# Patient Record
Sex: MALE | Race: WHITE | NOT HISPANIC OR LATINO
[De-identification: names, ages, dates, MRNs, and addresses within clinical notes are randomized per-mention and may not be internally consistent; named-entity substitution may affect disease eponyms.]

---

## 2022-05-23 ENCOUNTER — NON-APPOINTMENT (OUTPATIENT)
Age: 87
End: 2022-05-23

## 2022-05-23 ENCOUNTER — APPOINTMENT (OUTPATIENT)
Dept: CARDIOLOGY | Facility: CLINIC | Age: 87
End: 2022-05-23
Payer: MEDICARE

## 2022-05-23 ENCOUNTER — APPOINTMENT (OUTPATIENT)
Dept: UROLOGY | Facility: CLINIC | Age: 87
End: 2022-05-23
Payer: MEDICARE

## 2022-05-23 VITALS
SYSTOLIC BLOOD PRESSURE: 130 MMHG | TEMPERATURE: 97.8 F | BODY MASS INDEX: 18.26 KG/M2 | WEIGHT: 93 LBS | OXYGEN SATURATION: 99 % | HEIGHT: 60 IN | HEART RATE: 76 BPM | DIASTOLIC BLOOD PRESSURE: 70 MMHG

## 2022-05-23 VITALS
OXYGEN SATURATION: 98 % | HEIGHT: 60 IN | WEIGHT: 93 LBS | TEMPERATURE: 94.3 F | BODY MASS INDEX: 18.26 KG/M2 | HEART RATE: 59 BPM | DIASTOLIC BLOOD PRESSURE: 74 MMHG | SYSTOLIC BLOOD PRESSURE: 159 MMHG

## 2022-05-23 DIAGNOSIS — Z86.79 PERSONAL HISTORY OF OTHER DISEASES OF THE CIRCULATORY SYSTEM: ICD-10-CM

## 2022-05-23 DIAGNOSIS — R42 DIZZINESS AND GIDDINESS: ICD-10-CM

## 2022-05-23 DIAGNOSIS — R06.02 SHORTNESS OF BREATH: ICD-10-CM

## 2022-05-23 DIAGNOSIS — Z87.438 PERSONAL HISTORY OF OTHER DISEASES OF MALE GENITAL ORGANS: ICD-10-CM

## 2022-05-23 DIAGNOSIS — R07.9 CHEST PAIN, UNSPECIFIED: ICD-10-CM

## 2022-05-23 DIAGNOSIS — Z87.19 PERSONAL HISTORY OF OTHER DISEASES OF THE DIGESTIVE SYSTEM: ICD-10-CM

## 2022-05-23 DIAGNOSIS — E78.5 HYPERLIPIDEMIA, UNSPECIFIED: ICD-10-CM

## 2022-05-23 DIAGNOSIS — Z87.440 PERSONAL HISTORY OF URINARY (TRACT) INFECTIONS: ICD-10-CM

## 2022-05-23 DIAGNOSIS — Z78.9 OTHER SPECIFIED HEALTH STATUS: ICD-10-CM

## 2022-05-23 DIAGNOSIS — Z63.4 DISAPPEARANCE AND DEATH OF FAMILY MEMBER: ICD-10-CM

## 2022-05-23 DIAGNOSIS — K59.09 OTHER CONSTIPATION: ICD-10-CM

## 2022-05-23 PROBLEM — Z00.00 ENCOUNTER FOR PREVENTIVE HEALTH EXAMINATION: Status: ACTIVE | Noted: 2022-05-23

## 2022-05-23 LAB
BILIRUB UR QL STRIP: NEGATIVE
CLARITY UR: CLEAR
COLLECTION METHOD: NORMAL
GLUCOSE UR-MCNC: NEGATIVE
HCG UR QL: 0.2 EU/DL
HGB UR QL STRIP.AUTO: NEGATIVE
KETONES UR-MCNC: NEGATIVE
LEUKOCYTE ESTERASE UR QL STRIP: NEGATIVE
NITRITE UR QL STRIP: NEGATIVE
PH UR STRIP: 6
PROT UR STRIP-MCNC: NEGATIVE
SP GR UR STRIP: 1.01

## 2022-05-23 PROCEDURE — 81002 URINALYSIS NONAUTO W/O SCOPE: CPT

## 2022-05-23 PROCEDURE — 99205 OFFICE O/P NEW HI 60 MIN: CPT

## 2022-05-23 PROCEDURE — 93000 ELECTROCARDIOGRAM COMPLETE: CPT | Mod: 59

## 2022-05-23 PROCEDURE — 93242 EXT ECG>48HR<7D RECORDING: CPT

## 2022-05-23 RX ORDER — DOCUSATE SODIUM 100 MG/1
100 CAPSULE ORAL
Qty: 60 | Refills: 0 | Status: ACTIVE | COMMUNITY
Start: 2022-05-23 | End: 1900-01-01

## 2022-05-23 RX ORDER — ATORVASTATIN CALCIUM 10 MG/1
10 TABLET, FILM COATED ORAL DAILY
Qty: 90 | Refills: 3 | Status: ACTIVE | COMMUNITY
Start: 1900-01-01 | End: 1900-01-01

## 2022-05-23 RX ORDER — TAMSULOSIN HYDROCHLORIDE 0.4 MG/1
0.4 CAPSULE ORAL
Refills: 0 | Status: ACTIVE | COMMUNITY

## 2022-05-23 RX ORDER — TAMSULOSIN HYDROCHLORIDE 0.4 MG/1
0.4 CAPSULE ORAL TWICE DAILY
Qty: 40 | Refills: 5 | Status: ACTIVE | COMMUNITY
Start: 2022-05-23 | End: 1900-01-01

## 2022-05-23 SDOH — SOCIAL STABILITY - SOCIAL INSECURITY: DISSAPEARANCE AND DEATH OF FAMILY MEMBER: Z63.4

## 2022-05-23 NOTE — ASSESSMENT
[FreeTextEntry1] : 86 male s/p TURP in [place], due to enlarged median lobe - now with recurrent retention and severe LUTS, weak stream:\par \par Plan:\par Make tamsulosin 0.4 mg BID\par treat hard stool/constipation: Miralax/colace\par US KB for PVR and PS\par FU cystoscopy to check for bladder neck contracture/urethral stricture\par RTC 3 weeks

## 2022-05-23 NOTE — PHYSICAL EXAM
[General Appearance - Well Developed] : well developed [General Appearance - Well Nourished] : well nourished [Heart Rate And Rhythm] : Heart rate and rhythm were normal [] : no respiratory distress [Bowel Sounds] : normal bowel sounds [Abdomen Soft] : soft [Urinary Bladder Findings] : the bladder was normal on palpation [Normal Station and Gait] : the gait and station were normal for the patient's age [Skin Color & Pigmentation] : normal skin color and pigmentation [No Focal Deficits] : no focal deficits [Oriented To Time, Place, And Person] : oriented to person, place, and time

## 2022-05-23 NOTE — HISTORY OF PRESENT ILLNESS
[FreeTextEntry1] : 86 male patient with a pertinent past medical history of BPH GERD UTI HTN who presents to clinic today with complaints of LUTS: \par he started on tamsulosin once daily. he has undergone surgery in [place] in october 2021 - TURP\par he had urinary incontinence postop - they went back in again to OR and ? injection of agent- as per son, urinary incontinence resolved.\par however now patient has same issue, back to severe LUTS.\par incomplete emptying of urine and frequency is the most bothersome symptom.  \par Wakes up 2-3 x /night to use bathroom. \par Quality of his stream is  poor. \par  \par he does have constipation and always has hard stool.\par IPSS:29 currently post TURP\par  KB: 11/2021: PS=50 g / no PVR/ large median lobe\par \par \par

## 2022-05-23 NOTE — ASSESSMENT
[FreeTextEntry1] : History of bradycardia with dizziness -I recommended Zio patch for 3 days, carotid Doppler to see any severe stenosis.\par \par History of CAD and dizziness -I recommend echocardiogram for LV size, wall motion, LVEF and also recommend Lexiscan marker perfusion can.\par \par Symptoms of benign pressure hypertrophy -I recommend him to urologist, continue medications.\par \par Deafness -I have commended him to neurologist\par \par Risk factor monitoring has been discussed with him in great length; he will be reeval by me after cardiac testing.

## 2022-05-23 NOTE — HISTORY OF PRESENT ILLNESS
[FreeTextEntry1] : 86 years old  gentleman with history of CAD, dyslipidemia came for cardiac evaluation.\par \par Is remote history of for MI, lately his heart rate drops below 60 and he is very much concerned.  Intermittently he has feelings where he feels very weak nauseous diaphoretic and dizzy without any vertigo, episodes last few seconds to few minutes and dissipates by themselves, episodes are very intermittent, sometimes in resting condition sometimes when walking.\par \par No prior history of diabetes, hypertension, CHF.\par \par He has history of BPH, had a green light laser anemia few months ago followed by some other cardiac procedure, still having significant symptoms of bladder neck obstruction.  His stream is very weak with hesitation and frequency of urination especially at nighttime.

## 2022-05-24 LAB
APPEARANCE: CLEAR
BILIRUBIN URINE: NEGATIVE
BLOOD URINE: NEGATIVE
COLOR: YELLOW
GLUCOSE QUALITATIVE U: NEGATIVE
KETONES URINE: NEGATIVE
LEUKOCYTE ESTERASE URINE: NEGATIVE
NITRITE URINE: NEGATIVE
PH URINE: 6.5
PROTEIN URINE: NEGATIVE
SPECIFIC GRAVITY URINE: 1.01
UROBILINOGEN URINE: NORMAL

## 2022-06-08 ENCOUNTER — APPOINTMENT (OUTPATIENT)
Dept: CARDIOLOGY | Facility: CLINIC | Age: 87
End: 2022-06-08

## 2022-06-08 PROCEDURE — 93244 EXT ECG>48HR<7D REV&INTERPJ: CPT

## 2022-06-20 ENCOUNTER — OUTPATIENT (OUTPATIENT)
Dept: OUTPATIENT SERVICES | Facility: HOSPITAL | Age: 87
LOS: 1 days | End: 2022-06-20

## 2022-06-20 ENCOUNTER — APPOINTMENT (OUTPATIENT)
Dept: ULTRASOUND IMAGING | Facility: CLINIC | Age: 87
End: 2022-06-20
Payer: MEDICAID

## 2022-06-20 DIAGNOSIS — R39.12 POOR URINARY STREAM: ICD-10-CM

## 2022-06-20 PROCEDURE — 76770 US EXAM ABDO BACK WALL COMP: CPT | Mod: 26

## 2022-06-22 ENCOUNTER — APPOINTMENT (OUTPATIENT)
Dept: UROLOGY | Facility: CLINIC | Age: 87
End: 2022-06-22
Payer: MEDICAID

## 2022-06-22 VITALS
SYSTOLIC BLOOD PRESSURE: 164 MMHG | HEART RATE: 56 BPM | TEMPERATURE: 95 F | HEIGHT: 60 IN | WEIGHT: 93 LBS | OXYGEN SATURATION: 99 % | DIASTOLIC BLOOD PRESSURE: 70 MMHG | BODY MASS INDEX: 18.26 KG/M2

## 2022-06-22 LAB
BILIRUB UR QL STRIP: NEGATIVE
CLARITY UR: CLEAR
COLLECTION METHOD: NORMAL
GLUCOSE UR-MCNC: NEGATIVE
HCG UR QL: 0.2 EU/DL
HGB UR QL STRIP.AUTO: NEGATIVE
KETONES UR-MCNC: NEGATIVE
LEUKOCYTE ESTERASE UR QL STRIP: NEGATIVE
NITRITE UR QL STRIP: NEGATIVE
PH UR STRIP: 6
PROT UR STRIP-MCNC: NEGATIVE
SP GR UR STRIP: 1

## 2022-06-22 PROCEDURE — 81002 URINALYSIS NONAUTO W/O SCOPE: CPT

## 2022-06-22 PROCEDURE — 52000 CYSTOURETHROSCOPY: CPT

## 2022-06-23 ENCOUNTER — OUTPATIENT (OUTPATIENT)
Dept: OUTPATIENT SERVICES | Facility: HOSPITAL | Age: 87
LOS: 1 days | End: 2022-06-23
Payer: MEDICARE

## 2022-06-23 ENCOUNTER — APPOINTMENT (OUTPATIENT)
Dept: ULTRASOUND IMAGING | Facility: CLINIC | Age: 87
End: 2022-06-23

## 2022-06-23 DIAGNOSIS — R39.12 POOR URINARY STREAM: ICD-10-CM

## 2022-06-23 PROCEDURE — 93975 VASCULAR STUDY: CPT

## 2022-06-23 PROCEDURE — 93975 VASCULAR STUDY: CPT | Mod: 26

## 2022-06-27 ENCOUNTER — APPOINTMENT (OUTPATIENT)
Dept: CARDIOLOGY | Facility: CLINIC | Age: 87
End: 2022-06-27

## 2022-06-27 ENCOUNTER — APPOINTMENT (OUTPATIENT)
Dept: CARDIOLOGY | Facility: CLINIC | Age: 87
End: 2022-06-27
Payer: MEDICAID

## 2022-06-27 PROCEDURE — 93306 TTE W/DOPPLER COMPLETE: CPT

## 2022-06-27 PROCEDURE — 93880 EXTRACRANIAL BILAT STUDY: CPT

## 2022-06-29 ENCOUNTER — NON-APPOINTMENT (OUTPATIENT)
Age: 87
End: 2022-06-29

## 2022-07-08 ENCOUNTER — OUTPATIENT (OUTPATIENT)
Dept: OUTPATIENT SERVICES | Facility: HOSPITAL | Age: 87
LOS: 1 days | End: 2022-07-08

## 2022-07-08 ENCOUNTER — TRANSCRIPTION ENCOUNTER (OUTPATIENT)
Age: 87
End: 2022-07-08

## 2022-07-08 PROCEDURE — 93010 ELECTROCARDIOGRAM REPORT: CPT

## 2022-07-12 ENCOUNTER — OUTPATIENT (OUTPATIENT)
Dept: OUTPATIENT SERVICES | Facility: HOSPITAL | Age: 87
LOS: 1 days | End: 2022-07-12
Payer: MEDICAID

## 2022-07-12 ENCOUNTER — APPOINTMENT (OUTPATIENT)
Dept: UROLOGY | Facility: HOSPITAL | Age: 87
End: 2022-07-12

## 2022-07-12 PROCEDURE — 52276 CYSTOSCOPY AND TREATMENT: CPT

## 2022-07-18 ENCOUNTER — APPOINTMENT (OUTPATIENT)
Dept: UROLOGY | Facility: CLINIC | Age: 87
End: 2022-07-18

## 2022-07-18 VITALS
BODY MASS INDEX: 18.46 KG/M2 | HEIGHT: 60 IN | HEART RATE: 65 BPM | OXYGEN SATURATION: 98 % | TEMPERATURE: 96.9 F | WEIGHT: 94 LBS | DIASTOLIC BLOOD PRESSURE: 69 MMHG | SYSTOLIC BLOOD PRESSURE: 175 MMHG

## 2022-07-18 DIAGNOSIS — N40.0 BENIGN PROSTATIC HYPERPLASIA WITHOUT LOWER URINARY TRACT SYMPMS: ICD-10-CM

## 2022-07-18 DIAGNOSIS — Z01.810 ENCOUNTER FOR PREPROCEDURAL CARDIOVASCULAR EXAMINATION: ICD-10-CM

## 2022-07-18 DIAGNOSIS — N35.919 UNSPECIFIED URETHRAL STRICTURE, MALE, UNSPECIFIED SITE: ICD-10-CM

## 2022-07-18 DIAGNOSIS — Z01.812 ENCOUNTER FOR PREPROCEDURAL LABORATORY EXAMINATION: ICD-10-CM

## 2022-07-18 DIAGNOSIS — R39.9 UNSPECIFIED SYMPTOMS AND SIGNS INVOLVING THE GENITOURINARY SYSTEM: ICD-10-CM

## 2022-07-18 PROCEDURE — 99213 OFFICE O/P EST LOW 20 MIN: CPT

## 2022-07-18 RX ORDER — PHENAZOPYRIDINE HYDROCHLORIDE 200 MG/1
200 TABLET ORAL
Qty: 60 | Refills: 0 | Status: ACTIVE | COMMUNITY
Start: 2022-07-18 | End: 1900-01-01

## 2022-07-18 NOTE — HISTORY OF PRESENT ILLNESS
[FreeTextEntry1] : PAST UROLOGIC HX:\par 86 male patient with a pertinent past medical history of BPH GERD UTI HTN who presents to clinic today with complaints of LUTS: \par he started on tamsulosin once daily. he has undergone surgery in [place] in october 2021 - TURP\par he had urinary incontinence postop - they went back in again to OR and ? injection of agent- as per son, urinary incontinence resolved.\par however now patient has same issue, back to severe LUTS.\par incomplete emptying of urine and frequency is the most bothersome symptom.  \par Wakes up 2-3 x /night to use bathroom. \par Quality of his stream is  poor. \par  \par he does have constipation and always has hard stool.\par IPSS:29 currently post TURP\par  KB: 11/2021: PS=50 g / no PVR/ large median lobe\par \par FU July 2022:  s/p laser treatment of urethral stricture with dr ramsey. - elam cath removed today, uncomplicated.

## 2022-07-18 NOTE — ASSESSMENT
[FreeTextEntry1] : 87 male s/p TURP in [place], due to enlarged median lobe - now with recurrent retention and severe LUTS, weak stream:\par FU July 2022:  s/p laser treatment of urethral stricture with dr ramsey. - elam cath removed today, uncomplicated.\par \par Plan:\par uroflowmetry + In office PVR in 2 weeks\par Pyridium script sent to pharmacy for dysuria

## 2022-07-20 DIAGNOSIS — K21.9 GASTRO-ESOPHAGEAL REFLUX DISEASE WITHOUT ESOPHAGITIS: ICD-10-CM

## 2022-07-20 DIAGNOSIS — N99.114 POSTPROCEDURAL URETHRAL STRICTURE, MALE, UNSPECIFIED: ICD-10-CM

## 2022-07-20 DIAGNOSIS — E78.5 HYPERLIPIDEMIA, UNSPECIFIED: ICD-10-CM

## 2022-07-21 ENCOUNTER — APPOINTMENT (OUTPATIENT)
Dept: CARDIOLOGY | Facility: CLINIC | Age: 87
End: 2022-07-21

## 2022-07-21 PROCEDURE — A9502: CPT

## 2022-07-21 PROCEDURE — 78452 HT MUSCLE IMAGE SPECT MULT: CPT

## 2022-07-21 PROCEDURE — 93015 CV STRESS TEST SUPVJ I&R: CPT

## 2022-08-03 ENCOUNTER — APPOINTMENT (OUTPATIENT)
Dept: UROLOGY | Facility: CLINIC | Age: 87
End: 2022-08-03

## 2022-08-03 VITALS
WEIGHT: 94 LBS | BODY MASS INDEX: 18.46 KG/M2 | HEART RATE: 73 BPM | HEIGHT: 60 IN | DIASTOLIC BLOOD PRESSURE: 82 MMHG | SYSTOLIC BLOOD PRESSURE: 177 MMHG | TEMPERATURE: 97.9 F

## 2022-08-03 DIAGNOSIS — N35.919 UNSPECIFIED URETHRAL STRICTURE, MALE, UNSPECIFIED SITE: ICD-10-CM

## 2022-08-03 PROCEDURE — 99213 OFFICE O/P EST LOW 20 MIN: CPT

## 2022-08-03 NOTE — PHYSICAL EXAM
[General Appearance - Well Developed] : well developed [General Appearance - Well Nourished] : well nourished [Bowel Sounds] : normal bowel sounds [Abdomen Soft] : soft [Urinary Bladder Findings] : the bladder was normal on palpation [Skin Color & Pigmentation] : normal skin color and pigmentation [Heart Rate And Rhythm] : Heart rate and rhythm were normal [] : no respiratory distress [Oriented To Time, Place, And Person] : oriented to person, place, and time [Normal Station and Gait] : the gait and station were normal for the patient's age [No Focal Deficits] : no focal deficits

## 2022-08-03 NOTE — ASSESSMENT
[FreeTextEntry1] : 87 male s/p TURP in [place], due to enlarged median lobe - now with recurrent retention and severe LUTS, weak stream:\par FU July 2022:  s/p laser treatment of urethral stricture with dr ramsey. - elam cath removed today, uncomplicated.\par FU August 2022: pt reports urinary symptoms have greatly improved. \par \par Plan:\par - Followup surveillance cystoscopy, with urethral dilation \par - Pt leaving for [facility] in September, will plan for follow up in 3 weeks prior to trip. \par

## 2022-08-03 NOTE — HISTORY OF PRESENT ILLNESS
[FreeTextEntry1] : PAST UROLOGIC HX:\par 86 male patient with a pertinent past medical history of BPH GERD UTI HTN who presents to clinic today with complaints of LUTS: \par he started on tamsulosin once daily. he has undergone surgery in [place] in october 2021 - TURP\par he had urinary incontinence postop - they went back in again to OR and ? injection of agent- as per son, urinary incontinence resolved.\par however now patient has same issue, back to severe LUTS.\par incomplete emptying of urine and frequency is the most bothersome symptom.  \par Wakes up 2-3 x /night to use bathroom. \par Quality of his stream is  poor. \par  \par he does have constipation and always has hard stool.\par IPSS:29 currently post TURP\par  KB: 11/2021: PS=50 g / no PVR/ large median lobe\par \par FU July 2022:  s/p laser treatment of urethral stricture with dr ramsey. - elam cath removed today, uncomplicated.\par \par FU August 2022: pt doing well, urinary symptoms greatly improved post procedure. Reports occasional, infrequent urinary leakage.

## 2022-08-17 ENCOUNTER — APPOINTMENT (OUTPATIENT)
Dept: UROLOGY | Facility: CLINIC | Age: 87
End: 2022-08-17

## 2022-08-17 VITALS
HEART RATE: 59 BPM | SYSTOLIC BLOOD PRESSURE: 172 MMHG | WEIGHT: 94 LBS | TEMPERATURE: 98.1 F | HEIGHT: 60 IN | DIASTOLIC BLOOD PRESSURE: 73 MMHG | BODY MASS INDEX: 18.46 KG/M2

## 2022-08-18 ENCOUNTER — APPOINTMENT (OUTPATIENT)
Dept: UROLOGY | Facility: CLINIC | Age: 87
End: 2022-08-18

## 2022-08-18 VITALS
HEART RATE: 62 BPM | BODY MASS INDEX: 18.46 KG/M2 | DIASTOLIC BLOOD PRESSURE: 62 MMHG | WEIGHT: 94 LBS | OXYGEN SATURATION: 98 % | SYSTOLIC BLOOD PRESSURE: 140 MMHG | HEIGHT: 60 IN | TEMPERATURE: 96.6 F

## 2022-08-18 PROCEDURE — 52000 CYSTOURETHROSCOPY: CPT

## 2022-08-31 ENCOUNTER — APPOINTMENT (OUTPATIENT)
Dept: UROLOGY | Facility: CLINIC | Age: 87
End: 2022-08-31

## 2022-09-03 ENCOUNTER — EMERGENCY (EMERGENCY)
Facility: HOSPITAL | Age: 87
LOS: 1 days | Discharge: HOME | End: 2022-09-03
Admitting: EMERGENCY MEDICINE

## 2022-09-03 DIAGNOSIS — N39.0 URINARY TRACT INFECTION, SITE NOT SPECIFIED: ICD-10-CM
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
## 2022-09-03 DIAGNOSIS — Z46.6 ENCOUNTER FOR FITTING AND ADJUSTMENT OF URINARY DEVICE: ICD-10-CM

## 2022-09-03 DIAGNOSIS — T83.038A LEAKAGE OF OTHER URINARY CATHETER, INITIAL ENCOUNTER: ICD-10-CM
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
## 2022-09-03 PROCEDURE — 99284 EMERGENCY DEPT VISIT MOD MDM: CPT

## 2022-09-13 ENCOUNTER — APPOINTMENT (OUTPATIENT)
Dept: UROLOGY | Facility: CLINIC | Age: 87
End: 2022-09-13
Payer: MEDICARE

## 2022-09-13 VITALS
BODY MASS INDEX: 20.77 KG/M2 | DIASTOLIC BLOOD PRESSURE: 81 MMHG | WEIGHT: 110 LBS | HEIGHT: 61 IN | HEART RATE: 79 BPM | OXYGEN SATURATION: 98 % | SYSTOLIC BLOOD PRESSURE: 171 MMHG | RESPIRATION RATE: 17 BRPM

## 2022-09-13 DIAGNOSIS — N35.919 UNSPECIFIED URETHRAL STRICTURE, MALE, UNSPECIFIED SITE: ICD-10-CM

## 2022-09-13 DIAGNOSIS — R39.12 POOR URINARY STREAM: ICD-10-CM

## 2022-09-13 PROCEDURE — 99214 OFFICE O/P EST MOD 30 MIN: CPT

## 2022-09-13 NOTE — ASSESSMENT
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  We discussed repeat endoscopic management, chronic catheter, dilation with intermittent self dilation, and urethroplasty.  Discussed the typical reported success rates with each treatment option.  Discussed with pt/son given his age there is considerable morbidity to a prolonged anesthesia and surgical reconstruction.  However if he desires to undergo urethroplasty would need to provide a period of urethral rest and further evaluation w RUG.\par \par After discussion, pt wishes to try CIC.  He does not wish to undergo additional surgery at this time.\par \par RN perform CIC teaching\par Catheter removed - pt taught and performed CIC w 16Fr catheter without issue\par \par F/u prn, f/u with Dr Dasilva

## 2022-09-13 NOTE — PHYSICAL EXAM
[General Appearance - Well Developed] : well developed [General Appearance - Well Nourished] : well nourished [Normal Appearance] : normal appearance [Well Groomed] : well groomed [General Appearance - In No Acute Distress] : no acute distress [Edema] : no peripheral edema [] : no respiratory distress [Respiration, Rhythm And Depth] : normal respiratory rhythm and effort [Exaggerated Use Of Accessory Muscles For Inspiration] : no accessory muscle use [Urethral Meatus] : meatus normal [Urinary Bladder Findings] : the bladder was normal on palpation [FreeTextEntry1] : elam in place clear yellow urine

## 2022-09-13 NOTE — HISTORY OF PRESENT ILLNESS
[FreeTextEntry1] : Patient is a 88 yo M who presents for urethral stricture.\par He is referred by Dr Dasilva for consultation.\par He has h/o BPH s/p TURP in [place] in Oct 2021. He had urinary incontinence postop - they went back in again to OR and ? injection of agent- as per son, urinary incontinence resolved.  Records from [place] reviewed - monopolar TURP for 50 gm prostate.  Per OP note, normal urethra at that time.\par however now patient has same issue, back to severe LUTS.\par Baseline incomplete emptying of urine and frequency is the most bothersome symptom. \par Wakes up 2-3 x /night to use bathroom. \par Quality of his stream is poor. \par  \par he does have constipation and always has hard stool.\par IPSS:29 currently post TURP\par \par He had renal/bladder ULT in 6/2022 which showed no significant PVR and mildly enlarged prostate 33cc.  Scrotal ULT large L epididymal cyst.\par Cystoscopy by Dr Dasilva showed urethral strictures.\par He then underwent laser incision by Dr Mcconnell in 7/2022.\par Initially did well post incision.\par However shortly thereafter within a few wks his LUTS recurred.\par Last visit with Dr Dasilva he was dilated and had elam placed.\par Was upsized to 20Fr catheter. \par \par

## 2022-09-21 NOTE — PHYSICAL EXAM
[General Appearance - Well Developed] : well developed [General Appearance - Well Nourished] : well nourished [Bowel Sounds] : normal bowel sounds [Abdomen Soft] : soft [Urinary Bladder Findings] : the bladder was normal on palpation [Skin Color & Pigmentation] : normal skin color and pigmentation [Heart Rate And Rhythm] : Heart rate and rhythm were normal Right lower quadrant abdominal pain [] : no respiratory distress [Oriented To Time, Place, And Person] : oriented to person, place, and time [Normal Station and Gait] : the gait and station were normal for the patient's age [No Focal Deficits] : no focal deficits Gastritis

## 2022-09-22 ENCOUNTER — APPOINTMENT (OUTPATIENT)
Dept: UROLOGY | Facility: CLINIC | Age: 87
End: 2022-09-22

## 2022-09-22 PROCEDURE — 51702 INSERT TEMP BLADDER CATH: CPT

## 2022-09-22 NOTE — HISTORY OF PRESENT ILLNESS
[FreeTextEntry1] : PAST UROLOGIC HX:\par 86 male patient with a pertinent past medical history of BPH GERD UTI HTN who presents to clinic today with complaints of LUTS: \par he started on tamsulosin once daily. he has undergone surgery in [place] in october 2021 - TURP\par he had urinary incontinence postop - they went back in again to OR and ? injection of agent- as per son, urinary incontinence resolved.\par however now patient has same issue, back to severe LUTS.\par incomplete emptying of urine and frequency is the most bothersome symptom.  \par Wakes up 2-3 x /night to use bathroom. \par Quality of his stream is  poor. \par  \par he does have constipation and always has hard stool.\par IPSS:29 currently post TURP\par  KB: 11/2021: PS=50 g / no PVR/ large median lobe\par \par FU July 2022:  s/p laser treatment of urethral stricture with dr chauhan. - elam cath removed today, uncomplicated.\par \par FU August 2022: pt doing well, urinary symptoms greatly improved post procedure. Reports occasional, infrequent urinary leakage. \par \par FU 8/18/22- pt underwent cystoscopy, evidence of reformation/recurrent urethral stricture and narrowing urethra. Wire passed, urethra bluntly dilated with flexible cystoscope and 20f elam passed over guidewire. Referred to Dr. Escalona for opinion re: recurrent stricture.\par \par FU 9/22/22: Pt seen by Dr. Escalona, reviewed management options for recurrent stricture. Pt did not want to undergo additional surgeries, opted for CIC. Teaching was provided, papa was dc'd. Patient attempted CIC but is not comfortable with it - prefers indwelling elam catheter. recent UTI, seen in urgent care, started oral antibiotics.\par \par

## 2022-10-03 ENCOUNTER — LABORATORY RESULT (OUTPATIENT)
Age: 87
End: 2022-10-03

## 2022-10-03 ENCOUNTER — APPOINTMENT (OUTPATIENT)
Dept: UROLOGY | Facility: CLINIC | Age: 87
End: 2022-10-03

## 2022-10-03 VITALS
SYSTOLIC BLOOD PRESSURE: 187 MMHG | OXYGEN SATURATION: 99 % | HEART RATE: 103 BPM | HEIGHT: 61 IN | DIASTOLIC BLOOD PRESSURE: 71 MMHG | BODY MASS INDEX: 20.77 KG/M2 | TEMPERATURE: 96.8 F | WEIGHT: 110 LBS

## 2022-10-03 PROCEDURE — 51702 INSERT TEMP BLADDER CATH: CPT

## 2022-10-03 RX ORDER — LEVOFLOXACIN 500 MG/1
500 TABLET, FILM COATED ORAL
Qty: 7 | Refills: 0 | Status: ACTIVE | COMMUNITY
Start: 2022-10-03 | End: 1900-01-01

## 2022-10-03 RX ORDER — SOLIFENACIN SUCCINATE 10 MG/1
10 TABLET ORAL DAILY
Qty: 180 | Refills: 0 | Status: ACTIVE | COMMUNITY
Start: 2022-10-03 | End: 1900-01-01

## 2022-10-05 LAB
APPEARANCE: ABNORMAL
BILIRUBIN URINE: NEGATIVE
BLOOD URINE: ABNORMAL
COLOR: COLORLESS
GLUCOSE QUALITATIVE U: NEGATIVE
KETONES URINE: NEGATIVE
LEUKOCYTE ESTERASE URINE: ABNORMAL
NITRITE URINE: NEGATIVE
PH URINE: 7
PROTEIN URINE: ABNORMAL
SPECIFIC GRAVITY URINE: 1.01
UROBILINOGEN URINE: NORMAL

## 2022-10-10 LAB — BACTERIA UR CULT: ABNORMAL

## 2022-10-26 NOTE — ASSESSMENT
[FreeTextEntry1] : 87 male s/p TURP in [place], due to enlarged median lobe - now with recurrent retention and severe LUTS, weak stream:\par FU July 2022:  s/p laser treatment of urethral stricture with dr ramsey. - elam cath removed today, uncomplicated.\par FU August 2022: pt reports urinary symptoms have greatly improved. \par \par FU 8/18/22- pt underwent cystoscopy, evidence of reformation/recurrent urethral stricture and narrowing urethra. Wire passed, urethra bluntly dilated with flexible cystoscope and 20f elam passed over guidewire. Referred to Dr. Escalona for opinion re: recurrent stricture.\par FU 9/22/22: Pt seen by Dr. Escalona, reviewed management options for recurrent stricture. Pt did not want to undergo additional surgeries, opted for CIC. Teaching was provided, papa was dc'd. Patient attempted CIC but is not comfortable with it - prefers indwelling elam catheter. recent UTI, seen in urgent care, started oral antibiotics.\par \par Plan: \par keep indwelling elam catheter\par change catheter Q1 month.\par \par  fall precautions